# Patient Record
Sex: FEMALE
[De-identification: names, ages, dates, MRNs, and addresses within clinical notes are randomized per-mention and may not be internally consistent; named-entity substitution may affect disease eponyms.]

---

## 2017-08-29 VITALS
WEIGHT: 150 LBS | SYSTOLIC BLOOD PRESSURE: 120 MMHG | HEIGHT: 61 IN | HEART RATE: 84 BPM | DIASTOLIC BLOOD PRESSURE: 85 MMHG | RESPIRATION RATE: 15 BRPM | BODY MASS INDEX: 28.32 KG/M2

## 2017-11-17 ENCOUNTER — RECORD ABSTRACTING (OUTPATIENT)
Age: 42
End: 2017-11-17

## 2017-11-17 DIAGNOSIS — Z92.89 PERSONAL HISTORY OF OTHER MEDICAL TREATMENT: ICD-10-CM

## 2017-11-17 DIAGNOSIS — Z86.39 PERSONAL HISTORY OF OTHER ENDOCRINE, NUTRITIONAL AND METABOLIC DISEASE: ICD-10-CM

## 2017-11-21 ENCOUNTER — APPOINTMENT (OUTPATIENT)
Dept: ENDOCRINOLOGY | Facility: CLINIC | Age: 42
End: 2017-11-21

## 2018-02-20 ENCOUNTER — APPOINTMENT (OUTPATIENT)
Dept: ENDOCRINOLOGY | Facility: CLINIC | Age: 43
End: 2018-02-20

## 2018-03-20 ENCOUNTER — APPOINTMENT (OUTPATIENT)
Dept: ENDOCRINOLOGY | Facility: CLINIC | Age: 43
End: 2018-03-20
Payer: MEDICAID

## 2018-03-20 ENCOUNTER — LABORATORY RESULT (OUTPATIENT)
Age: 43
End: 2018-03-20

## 2018-03-20 VITALS
DIASTOLIC BLOOD PRESSURE: 86 MMHG | HEIGHT: 61 IN | SYSTOLIC BLOOD PRESSURE: 138 MMHG | HEART RATE: 101 BPM | BODY MASS INDEX: 28.89 KG/M2 | WEIGHT: 153 LBS

## 2018-03-20 DIAGNOSIS — Z82.49 FAMILY HISTORY OF ISCHEMIC HEART DISEASE AND OTHER DISEASES OF THE CIRCULATORY SYSTEM: ICD-10-CM

## 2018-03-20 PROCEDURE — 36415 COLL VENOUS BLD VENIPUNCTURE: CPT

## 2018-03-20 PROCEDURE — 99205 OFFICE O/P NEW HI 60 MIN: CPT | Mod: 25

## 2018-04-11 LAB
25(OH)D3 SERPL-MCNC: 21.1 NG/ML
HBA1C MFR BLD HPLC: 5.4 %
PROLACTIN SERPL-MCNC: 27 NG/ML
T4 FREE SERPL-MCNC: 1.8 NG/DL
THYROGLOB AB SERPL-ACNC: <20 IU/ML
THYROGLOB SERPL-MCNC: 0.27 NG/ML
TSH SERPL-ACNC: 0.1 UIU/ML

## 2018-04-24 ENCOUNTER — APPOINTMENT (OUTPATIENT)
Dept: ENDOCRINOLOGY | Facility: CLINIC | Age: 43
End: 2018-04-24
Payer: MEDICAID

## 2018-04-24 VITALS
SYSTOLIC BLOOD PRESSURE: 138 MMHG | WEIGHT: 153 LBS | HEART RATE: 84 BPM | HEIGHT: 60 IN | BODY MASS INDEX: 30.04 KG/M2 | DIASTOLIC BLOOD PRESSURE: 80 MMHG

## 2018-04-24 DIAGNOSIS — E55.9 VITAMIN D DEFICIENCY, UNSPECIFIED: ICD-10-CM

## 2018-04-24 PROCEDURE — 99214 OFFICE O/P EST MOD 30 MIN: CPT

## 2018-04-27 ENCOUNTER — TRANSCRIPTION ENCOUNTER (OUTPATIENT)
Age: 43
End: 2018-04-27

## 2018-10-30 ENCOUNTER — APPOINTMENT (OUTPATIENT)
Dept: ENDOCRINOLOGY | Facility: CLINIC | Age: 43
End: 2018-10-30
Payer: MEDICAID

## 2018-10-30 VITALS
DIASTOLIC BLOOD PRESSURE: 74 MMHG | HEIGHT: 60 IN | BODY MASS INDEX: 30.82 KG/M2 | HEART RATE: 96 BPM | WEIGHT: 157 LBS | SYSTOLIC BLOOD PRESSURE: 138 MMHG

## 2018-10-30 DIAGNOSIS — C73 MALIGNANT NEOPLASM OF THYROID GLAND: ICD-10-CM

## 2018-10-30 PROCEDURE — 99214 OFFICE O/P EST MOD 30 MIN: CPT

## 2018-11-05 ENCOUNTER — RX RENEWAL (OUTPATIENT)
Age: 43
End: 2018-11-05

## 2018-11-18 LAB
T4 FREE SERPL-MCNC: 1.5 NG/DL
THYROGLOB AB SERPL-ACNC: <20 IU/ML
THYROGLOB SERPL-MCNC: <0.2 NG/ML
TSH SERPL-ACNC: 0.06 UIU/ML

## 2019-11-26 ENCOUNTER — APPOINTMENT (OUTPATIENT)
Dept: ENDOCRINOLOGY | Facility: CLINIC | Age: 44
End: 2019-11-26
Payer: MEDICAID

## 2019-11-26 VITALS
SYSTOLIC BLOOD PRESSURE: 124 MMHG | DIASTOLIC BLOOD PRESSURE: 83 MMHG | HEART RATE: 89 BPM | HEIGHT: 60 IN | WEIGHT: 158 LBS | BODY MASS INDEX: 31.02 KG/M2

## 2019-11-26 PROCEDURE — 99214 OFFICE O/P EST MOD 30 MIN: CPT

## 2019-11-26 NOTE — PHYSICAL EXAM
[Alert] : alert [Normal Sclera/Conjunctiva] : normal sclera/conjunctiva [Normal Outer Ear/Nose] : the ears and nose were normal in appearance [Well Healed Scar] : well healed scar [Normal PMI] : the apical impulse was normal [No Respiratory Distress] : no respiratory distress [No Stigmata of Cushings Syndrome] : no stigmata of cushings syndrome [No CVA Tenderness] : no ~M costovertebral angle tenderness [Normal Gait] : normal gait [Oriented x3] : oriented to person, place, and time [No Tremors] : no tremors

## 2019-11-27 NOTE — ASSESSMENT
[Levothyroxine] : The patient was instructed to take Levothyroxine on an empty stomach, separate from vitamins, and wait at least 30 minutes before eating [FreeTextEntry1] : 43 y/o F with:\par 1. Hx of thyroidectomy (12/21/12) for multifocal bilateral papillary CA at [facility]:\par There is no clinical/imaging evidence of recurrence. Recommend pt continue levothyroxine 125 mcg daily.\par Labs ordered today including thyroid US and TFT.\par \par Return in 4 months.

## 2019-11-27 NOTE — REVIEW OF SYSTEMS
[Negative] : Integumentary [Recent Weight Loss (___ Lbs)] : no recent weight loss [Palpitations] : no palpitations [FreeTextEntry4] : denies neck discomfort, voice changes

## 2019-11-27 NOTE — END OF VISIT
[FreeTextEntry3] : All medical record entries made by the Scribe were at my, Dr. Jair Calderon, direction and personally dictated by me on 11/26/2019. I have reviewed the chart and agree that the record accurately reflects my personal performance of the history, physical exam, assessment and plan. I have also personally directed, reviewed and agreed with the chart.  [>50% of Time Spent on Counseling for ____] : Greater than 50% of the encounter time was spent on counseling for [unfilled] [Time Spent: ___ minutes] : I have spent [unfilled] minutes of face to face time with the patient

## 2019-11-27 NOTE — HISTORY OF PRESENT ILLNESS
[FreeTextEntry1] : 03/13 FALL ablation\par 03/20/18,thyroglobulin ab < 20, thyroglobulin 0.27, tsh 0.10, prol 27, a 25 oh vit D 21.1\par 04/17/18, thyroid u/s : A. status post thyroidectomy. No evidence of cyst a solid lesion is noted in the surgical bed \par 10/30/18 TSH 0.06, Free T4 1.5, thyroglobulin <0.20, thyroglobulin ab <20.0\par \par 44 years old female with Hx of thyroidectomy (12/21/12) 2/2  multifocal bilateral papillary CA at [facility]\par (R lobe two nodules: 1.0 cm and 0.7 cm nodules. L lobe 0.5 cm nodule)\par Pt had yearly endocrine follow-up until July 2017 with no evidence of tumor recurrence\par \par 11/26/2019\par Pt presents today for endocrine f/u, feeling good with no major physical complaints.\par Denies palpitations, weight loss, voice changes, nocturia, and neck discomfort.\par \par Current Medications: Levothyroxine 125 mcg QD, Atorvastatin 10 mg.

## 2019-11-27 NOTE — ADDENDUM
[FreeTextEntry1] : I, Savita Sandy, acted solely as a scribe for Dr. Jair Calderon on this date. 11/26/2019. \par I, Miranda Mejia, acted solely as a scribe for Dr. Jair Calderon on this date. 11/26/2019.

## 2019-12-04 LAB
T4 FREE SERPL-MCNC: 1.4 NG/DL
THYROGLOB AB SERPL-ACNC: <20 IU/ML
THYROGLOB SERPL-MCNC: <0.2 NG/ML
TSH SERPL-ACNC: 0.06 UIU/ML

## 2020-01-30 ENCOUNTER — FORM ENCOUNTER (OUTPATIENT)
Age: 45
End: 2020-01-30

## 2020-01-31 ENCOUNTER — OUTPATIENT (OUTPATIENT)
Dept: OUTPATIENT SERVICES | Facility: HOSPITAL | Age: 45
LOS: 1 days | End: 2020-01-31

## 2020-01-31 ENCOUNTER — APPOINTMENT (OUTPATIENT)
Dept: ULTRASOUND IMAGING | Facility: CLINIC | Age: 45
End: 2020-01-31
Payer: MEDICAID

## 2020-01-31 PROCEDURE — 76536 US EXAM OF HEAD AND NECK: CPT | Mod: 26

## 2020-05-26 ENCOUNTER — APPOINTMENT (OUTPATIENT)
Dept: ENDOCRINOLOGY | Facility: CLINIC | Age: 45
End: 2020-05-26

## 2020-06-09 ENCOUNTER — APPOINTMENT (OUTPATIENT)
Dept: ENDOCRINOLOGY | Facility: CLINIC | Age: 45
End: 2020-06-09

## 2021-06-22 ENCOUNTER — APPOINTMENT (OUTPATIENT)
Dept: ENDOCRINOLOGY | Facility: CLINIC | Age: 46
End: 2021-06-22
Payer: MEDICAID

## 2021-06-22 ENCOUNTER — LABORATORY RESULT (OUTPATIENT)
Age: 46
End: 2021-06-22

## 2021-06-22 VITALS
HEART RATE: 92 BPM | BODY MASS INDEX: 31.8 KG/M2 | DIASTOLIC BLOOD PRESSURE: 94 MMHG | WEIGHT: 162 LBS | SYSTOLIC BLOOD PRESSURE: 151 MMHG | HEIGHT: 60 IN

## 2021-06-22 PROCEDURE — 99214 OFFICE O/P EST MOD 30 MIN: CPT

## 2021-06-24 LAB
T4 FREE SERPL-MCNC: 1.7 NG/DL
THYROGLOB AB SERPL-ACNC: <20 IU/ML
THYROGLOB SERPL-MCNC: 0.24 NG/ML
TSH SERPL-ACNC: 0.11 UIU/ML

## 2021-06-24 NOTE — PHYSICAL EXAM
[Alert] : alert [Normal Sclera/Conjunctiva] : normal sclera/conjunctiva [Normal Outer Ear/Nose] : the ears and nose were normal in appearance [No Respiratory Distress] : no respiratory distress [Normal S1, S2] : normal S1 and S2 [Normal Rate] : heart rate was normal [Normal Bowel Sounds] : normal bowel sounds [Spine Straight] : spine straight [Normal Gait] : normal gait [No Rash] : no rash [Normal Reflexes] : deep tendon reflexes were 2+ and symmetric [Oriented x3] : oriented to person, place, and time [de-identified] : no mass palpated in anterior neck, no lymph node enlargement

## 2021-06-24 NOTE — HISTORY OF PRESENT ILLNESS
[FreeTextEntry1] : 37 [FreeTextEntry2] : low normal [de-identified] : TT [de-identified] : 12/21/12

## 2021-06-24 NOTE — END OF VISIT
[FreeTextEntry3] : All medical record entries made by the Scribe were at my, Dr. Jair Calderon, direction and personally dictated by me on 06/22/2021. I have reviewed the chart and agree that the record accurately reflects my personal performance of the history, physical exam, assessment and plan. I have also personally directed, reviewed and agreed with the chart.  [Time Spent: ___ minutes] : I have spent [unfilled] minutes of time on the encounter.

## 2021-06-24 NOTE — ASSESSMENT
[Levothyroxine] : The patient was instructed to take Levothyroxine on an empty stomach, separate from vitamins, and wait at least 30 minutes before eating [FreeTextEntry1] : 46 y/o F with:\par \par 1. Hx of thyroidectomy (12/21/12) for multifocal bilateral papillary CA at [facility]:\par Last visit was in November 2019. 1/31/20 Thyroid u.s: R level 2 LN 1.5 X 0.5 X 1.3 CM. Left  level 2 LN 1.9 X 0.6 X 1.1 cm , no residual thyroid tissue. Pt on synthroid 125 mcg qd\par Pt is asymptomatic. Regarding thyroid physical exam, there were no palpable masses and no lymph nodes. \par Pt is at low risk for recurrence. \par Send for TFTs, thyroglobulin. \par Will contact pt with results. \par \par Return in 1 year.

## 2021-06-24 NOTE — REVIEW OF SYSTEMS
[Negative] : Heme/Lymph [Recent Weight Loss (___ Lbs)] : no recent weight loss [Dysphagia] : no dysphagia [Dysphonia] : no dysphonia [Palpitations] : no palpitations

## 2021-06-24 NOTE — ADDENDUM
[FreeTextEntry1] : I, Karen Benjamin, acted solely as a scribe for Dr. Jair Calderon on this date. 06/22/2021.

## 2022-10-12 ENCOUNTER — APPOINTMENT (OUTPATIENT)
Dept: ENDOCRINOLOGY | Facility: CLINIC | Age: 47
End: 2022-10-12

## 2022-10-12 VITALS
SYSTOLIC BLOOD PRESSURE: 121 MMHG | HEART RATE: 96 BPM | WEIGHT: 166 LBS | DIASTOLIC BLOOD PRESSURE: 77 MMHG | BODY MASS INDEX: 32.42 KG/M2

## 2022-10-12 DIAGNOSIS — E89.0 POSTPROCEDURAL HYPOTHYROIDISM: ICD-10-CM

## 2022-10-12 PROCEDURE — 36415 COLL VENOUS BLD VENIPUNCTURE: CPT

## 2022-10-12 PROCEDURE — 99214 OFFICE O/P EST MOD 30 MIN: CPT | Mod: 25

## 2022-10-12 RX ORDER — LEVOTHYROXINE SODIUM 125 UG/1
125 TABLET ORAL DAILY
Qty: 90 | Refills: 3 | Status: COMPLETED | COMMUNITY
Start: 2018-04-11 | End: 2022-10-12

## 2022-10-12 RX ORDER — CHOLECALCIFEROL (VITAMIN D3) 25 MCG
TABLET ORAL
Refills: 0 | Status: DISCONTINUED | COMMUNITY
End: 2022-10-12

## 2022-10-13 ENCOUNTER — TRANSCRIPTION ENCOUNTER (OUTPATIENT)
Age: 47
End: 2022-10-13

## 2022-10-14 RX ORDER — LEVOTHYROXINE SODIUM 0.11 MG/1
112 TABLET ORAL
Qty: 90 | Refills: 2 | Status: ACTIVE | COMMUNITY
Start: 1900-01-01 | End: 1900-01-01

## 2022-10-14 NOTE — HISTORY OF PRESENT ILLNESS
[FALL] : patient was treated with radioactive iodine [FreeTextEntry1] : 37 [FreeTextEntry2] : low normal [de-identified] : 12/21/12 [de-identified] : TT [de-identified] : for multifocal bilateral papillary CA [de-identified] : 3/2013 [de-identified] :  04/17/18  [de-identified] : thyroid u/s : A. status post thyroidectomy. No evidence of cyst a solid lesion is noted in the surgical bed \par  [de-identified] : 1/31/20 [de-identified] : US Thyroid Parathyroid. Findings: Status post total thyroidectomy with on residual thyroid tissue visualized. Ultrasound examination of the bilateral neck demonstrates a few normal-appearing lymph nodes in the neck bilaterally, the largest of which are: 1. R neck level 2 lymph node measuring 1.6 x 0.5 x 1.3 cm 2. L neck level 2 lymph node measuring 1.9 x 0.6 x 1.1 cm. These lymph nodes have normal echogenic don regions and normal blood flow on Doppler imaging. No cystic space or calcification within these lymph nodes. Impression: 1. Status post total thyroidectomy with no mass in the thyroid bed. 2. Benign appearing cervical lymph nodes are identified.

## 2022-10-14 NOTE — DATA REVIEWED
[FreeTextEntry1] : Labs: \par - 06/22/21: Thyroglobulin 0.24 (L), TPO Ab 26.5\par - 11/26/19: Thyroglobulin <0.20, Thyroglobulin Ab <20.0, TSH 0.06, Free T4 1.4 \par - 9/6/19: A1c 5.5%, s.creat 0.64, Ca 9.6, LDl-c 100, TSH 0.326, FT4 1.39, \par - 10/30/18 TSH 0.06, Free T4 1.5, thyroglobulin <0.20, thyroglobulin ab <20.0\par - 03/20/18,thyroglobulin ab < 20, thyroglobulin 0.27, tsh 0.10, prol 27, a 25 oh vit D 21.1\par \par Imaging:\par - 1/31/20: US Thyroid Parathyroid. Findings: Status post total thyroidectomy with on residual thyroid tissue visualized. Ultrasound examination of the bilateral neck demonstrates a few normal-appearing lymph nodes in the neck bilaterally, the largest of which are: 1. R neck level 2 lymph node measuring 1.6 x 0.5 x 1.3 cm 2. L neck level 2 lymph node measuring 1.9 x 0.6 x 1.1 cm. These lymph nodes have normal echogenic don regions and normal blood flow on Doppler imaging. No cystic space or calcification within these lymph nodes. Impression: 1. Status post total thyroidectomy with no mass in the thyroid bed. 2. Benign appearing cervical lymph nodes are identified. \par - 04/17/18 thyroid u/s : A. status post thyroidectomy. No evidence of cyst a solid lesion is noted in the surgical bed \par - 03/2013 FALL ablation

## 2022-10-14 NOTE — ASSESSMENT
[FreeTextEntry1] : \par \par  [Levothyroxine] : The patient was instructed to take Levothyroxine on an empty stomach, separate from vitamins, and wait at least 30 minutes before eating

## 2022-10-14 NOTE — PHYSICAL EXAM
[Alert] : alert [Normal Sclera/Conjunctiva] : normal sclera/conjunctiva [Normal Outer Ear/Nose] : the ears and nose were normal in appearance [No Respiratory Distress] : no respiratory distress [Normal S1, S2] : normal S1 and S2 [Normal Rate] : heart rate was normal [Normal Bowel Sounds] : normal bowel sounds [Spine Straight] : spine straight [Normal Gait] : normal gait [No Rash] : no rash [Normal Reflexes] : deep tendon reflexes were 2+ and symmetric [Oriented x3] : oriented to person, place, and time [Acanthosis Nigricans] : no acanthosis nigricans [de-identified] : no mass palpated in anterior neck, no lymph node enlargement

## 2022-10-14 NOTE — REVIEW OF SYSTEMS
[Negative] : Heme/Lymph [Recent Weight Loss (___ Lbs)] : no recent weight loss [Dysphagia] : no dysphagia [Dysphonia] : no dysphonia

## 2022-10-14 NOTE — END OF VISIT
[FreeTextEntry3] : All medical record entries made by the Scribe were at my, Dr. Jair Calderon, direction and personally dictated by me on 10/12/2022. I have reviewed the chart and agree that the record accurately reflects my personal performance of the history, physical exam, assessment and plan. I have also personally directed, reviewed and agreed with the chart.  [Time Spent: ___ minutes] : I have spent [unfilled] minutes of time on the encounter.

## 2022-10-14 NOTE — ADDENDUM
[FreeTextEntry1] : I Kermitblanca Clark act soley as a scribe for Dr. Jair Calderon on this date. 10/12/2022

## 2022-10-19 LAB
ALBUMIN SERPL ELPH-MCNC: 4.6 G/DL
ALP BLD-CCNC: 79 U/L
ALT SERPL-CCNC: 17 U/L
ANION GAP SERPL CALC-SCNC: 11 MMOL/L
AST SERPL-CCNC: 18 U/L
BILIRUB SERPL-MCNC: <0.2 MG/DL
BUN SERPL-MCNC: 17 MG/DL
CALCIUM SERPL-MCNC: 10.3 MG/DL
CHLORIDE SERPL-SCNC: 102 MMOL/L
CO2 SERPL-SCNC: 26 MMOL/L
CREAT SERPL-MCNC: 0.66 MG/DL
EGFR: 108 ML/MIN/1.73M2
GLUCOSE SERPL-MCNC: 107 MG/DL
POTASSIUM SERPL-SCNC: 4.6 MMOL/L
PROT SERPL-MCNC: 7.7 G/DL
SODIUM SERPL-SCNC: 139 MMOL/L
T4 FREE SERPL-MCNC: 1.5 NG/DL
THYROGLOB AB SERPL-ACNC: <20 IU/ML
THYROGLOB SERPL-MCNC: <0.2 NG/ML
TSH SERPL-ACNC: 0.22 UIU/ML

## 2024-09-11 ENCOUNTER — APPOINTMENT (OUTPATIENT)
Dept: ENDOCRINOLOGY | Facility: CLINIC | Age: 49
End: 2024-09-11

## 2024-12-05 ENCOUNTER — APPOINTMENT (OUTPATIENT)
Dept: ENDOCRINOLOGY | Facility: CLINIC | Age: 49
End: 2024-12-05
Payer: MEDICAID

## 2024-12-05 ENCOUNTER — LABORATORY RESULT (OUTPATIENT)
Age: 49
End: 2024-12-05

## 2024-12-05 VITALS
HEART RATE: 91 BPM | DIASTOLIC BLOOD PRESSURE: 67 MMHG | WEIGHT: 168 LBS | SYSTOLIC BLOOD PRESSURE: 122 MMHG | BODY MASS INDEX: 32.81 KG/M2

## 2024-12-05 DIAGNOSIS — C73 MALIGNANT NEOPLASM OF THYROID GLAND: ICD-10-CM

## 2024-12-05 DIAGNOSIS — E89.0 POSTPROCEDURAL HYPOTHYROIDISM: ICD-10-CM

## 2024-12-05 PROCEDURE — 99214 OFFICE O/P EST MOD 30 MIN: CPT | Mod: 25

## 2024-12-06 LAB
ALBUMIN SERPL ELPH-MCNC: 4.8 G/DL
ALP BLD-CCNC: 90 U/L
ALT SERPL-CCNC: 28 U/L
ANION GAP SERPL CALC-SCNC: 11 MMOL/L
AST SERPL-CCNC: 23 U/L
BILIRUB SERPL-MCNC: 0.3 MG/DL
BUN SERPL-MCNC: 16 MG/DL
CALCIUM SERPL-MCNC: 10.1 MG/DL
CHLORIDE SERPL-SCNC: 102 MMOL/L
CO2 SERPL-SCNC: 25 MMOL/L
CREAT SERPL-MCNC: 0.72 MG/DL
EGFR: 102 ML/MIN/1.73M2
GLUCOSE SERPL-MCNC: 97 MG/DL
POTASSIUM SERPL-SCNC: 4.7 MMOL/L
PROT SERPL-MCNC: 8 G/DL
SODIUM SERPL-SCNC: 138 MMOL/L
T4 FREE SERPL-MCNC: 2 NG/DL
THYROGLOB AB SERPL-ACNC: 17.3 IU/ML
THYROGLOB SERPL-MCNC: <0.1 NG/ML
TSH SERPL-ACNC: 0.24 UIU/ML

## 2025-02-19 ENCOUNTER — APPOINTMENT (OUTPATIENT)
Dept: ENDOCRINOLOGY | Facility: CLINIC | Age: 50
End: 2025-02-19
Payer: MEDICAID

## 2025-02-19 VITALS
BODY MASS INDEX: 32.81 KG/M2 | SYSTOLIC BLOOD PRESSURE: 138 MMHG | HEART RATE: 98 BPM | DIASTOLIC BLOOD PRESSURE: 82 MMHG | WEIGHT: 168 LBS

## 2025-02-19 DIAGNOSIS — Z98.890 OTHER SPECIFIED POSTPROCEDURAL STATES: ICD-10-CM

## 2025-02-19 DIAGNOSIS — Z90.89 OTHER SPECIFIED POSTPROCEDURAL STATES: ICD-10-CM

## 2025-02-19 PROCEDURE — 99214 OFFICE O/P EST MOD 30 MIN: CPT
